# Patient Record
(demographics unavailable — no encounter records)

---

## 2025-06-06 NOTE — PHYSICAL EXAM
[Abdomen Masses] : No abdominal masses [Abdomen Tenderness] : ~T No ~M abdominal tenderness [No HSM] : no hepatosplenomegaly [Normal] : was normal [None] : there was no rectal mass  [JVD] : no jugular venous distention  [Normal Breath Sounds] : Normal breath sounds [Normal Heart Sounds] : normal heart sounds [No Rash or Lesion] : No rash or lesion [Alert] : alert [de-identified] : Abdomen is soft, nontender, nondistended. Incisions are well healed. No hernia or masses  [FreeTextEntry1] : The risks , benefits and alternatives of the procedure were reviewed with the patient. The patient consents to the planned procedure.  The flexible sigmoidoscope was passed through the anus into the rectum. The scope was passed to   30  cm from the anal verge.  The findings revealed: Widely patent anastomosis at 3 cm from the anal verge with colonic J-pouch.  Able to traverse the afferent  limb to 30 cm

## 2025-06-06 NOTE — HISTORY OF PRESENT ILLNESS
[FreeTextEntry1] : 70 y/o Cantonese speaking M presents for follow up evaluation   Med/Onc: Dr. Anni Duran  H/o [id number] stage IIA rectal ca   S/p robotic-assisted low anterior resection with colonic J pouch creating and diverting ileostomy 1/31/22  Path c/w pT2N0 stage I MSI-stable. No LVI / PNI.   S/p ileostomy reversal 4/27/23 presents for follow up sigmoidoscopy  Med Onc: Dr. Duran Rad Onc: Dr. Taryn Mcclure  Onc history: H/o [id number] stage IIA rectal CA at 5 cm from AV. Completed Xeloda/RT 6/8/22 -7/19/22, S/p FOLFOX, received C6D1 on 10/18/22 w/ plans to complete 8 cycles.Per pt completed chemo November 18, 2022.  Post treatment MRI pelvis ([facility]) performed on 12/16/22 revealed incomplete response with residual tumor. Last CEA 2.1 (1/24/23)  s/p LAR w/ J pouch and diverting ileostomy 1/31/22, pathology c/w [id number]. No LVI or PNI. MMR intact  S/p Ileostomy reversal 4/27/23, post operative course c/b abdominal bloating. KUB obtained c/w ileus and continued monitoring. CT A/P 5/2 revealed possible diffuse function ileus, cannot exclude partial bowel obstruction from a non functioning J pouch. S/p flexible sigmoidoscopy 5/3 ruled out obstruction. Pt had PICC line and TPN. Ileus resolved and pt dc'd home with instructions VNS wet to dry packing.  Seen in post operative f/u 5/19/23, Patient reports occasional perianal irritation. Exam noted, no abdominal tenderness. Ileostomy site clean. Granulating. Patient recovering well from surgery. Recommended to continue local wound care. Return in 3 months to GI for surveillance colonoscopy.  Most recently seen in f/u 8/21/23,on exam, Ileostomy site with eschar. Removed and debrided, clean. Flexible sigmoidoscopy performed, widely patent anastomosis at 3 cm from the anal verge with colonic J-pouch. Able to traverse the afferent limb to 40 cm. Patient continues to be clinically HOMER. Recent CT scan reviewed which revealed no evidence of recurrence. F/u in 6 months.  As per outside records seen by [facility] Dr Mcclure 02/05/2024: As per note --Had colonoscopy with Dr. Erickson on 10/27/23 showed a 3mm polyp in the sigmoid colon which was removed and noted hyperplastic. 4mm polyp at the surgical anastamosis was removed and pathology, noted detached hyerplastic polyp and rectal ulcer.  Seen by Dr. Duran 01/11/24 CEA was stable at 4.3. -Scheduled to f/u Rad ONC in 3 months.  Most recently seen 2/26/24: flexible sigmoidoscopy performed scope passed to 40 cm from the AV. Widely patent anastomosis at 3 cm from the anal verge with colon J pouch. Able to traverse with afferent limb to 40 cm. Patient remains clinically HOMER. Recommended oncological surveillance scans and interim follow up in 6 months.  CEA (4/12/24) 4.7 ng/mL  CT C/A/P 4/29/24 performed [facility]: Impression: Unchanged small lung nodules.  Office visit 8/26/24, flexible sigmoidoscopy revealed widely patent anastomosis at 3 cm from the anal verge with colonic J-pouch.  Able to traverse the afferent limb to 40 cm. Patient remains HOMER.   Interval colonoscopy 12/19/24 performed by Gi Dr. Navya Erickson, internal non-bleeding hemorrhoids. Four 2-3 mm sessile polyps in the sigmoid colon, s/p biopsied. Surgical anastomosis at rectum at 15 cm, 3 mm polyp at the surgical anastomosis, s/p removed.   Pathology performed at [facility] 12/19/24 Rectal polyp, biopsy: Hyperplastic polyp Sigmoid colon polyp: Hyperplastic polyp   Patient returns today in follow up flexible sigmoidoscopy.  Overall feel well. Appetite has been well. Tolerating diet. Weight has been stable.  BM is still irregular. Occasionally will have BMs up to 4-5 times a day.  Denies blood in stool.   4/6/25 CT Chest/Abdomen and Pelvis at [facility] Impression: As compared to the patient's to the patient's prior CT scan of the chest abdomen pelvis 4/29/24, there has been an interval increase in size of lingular nodule previously 6.7 mm in the maximum diameter currently measuring 8.6 mm in maximum diameter. This nodule was not visualized on the CT scan of the chest from 2022. 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 The study is otherwise stable as compared to the patient's prior CT scan of chest abdomen pelvis of 4/29/24.  Reports has been recommended for PET scan.  Currently has next appointment with Dr. Duran 6/13.

## 2025-06-06 NOTE — ASSESSMENT
[FreeTextEntry1] : Recommend follow-up with medical oncology for recently identified CT findings of the lingula nodule.  PET scan ordered.  Follow-up 12 months  A Chinese  was utilized for the entire visit. All questions were addressed.